# Patient Record
Sex: FEMALE | Race: WHITE | NOT HISPANIC OR LATINO | Employment: OTHER | URBAN - METROPOLITAN AREA
[De-identification: names, ages, dates, MRNs, and addresses within clinical notes are randomized per-mention and may not be internally consistent; named-entity substitution may affect disease eponyms.]

---

## 2021-05-24 ENCOUNTER — RECORDS - HEALTHEAST (OUTPATIENT)
Dept: ADMINISTRATIVE | Facility: CLINIC | Age: 71
End: 2021-05-24

## 2023-05-20 ENCOUNTER — APPOINTMENT (OUTPATIENT)
Dept: ULTRASOUND IMAGING | Facility: CLINIC | Age: 73
End: 2023-05-20
Attending: EMERGENCY MEDICINE
Payer: COMMERCIAL

## 2023-05-20 ENCOUNTER — APPOINTMENT (OUTPATIENT)
Dept: RADIOLOGY | Facility: CLINIC | Age: 73
End: 2023-05-20
Attending: EMERGENCY MEDICINE
Payer: COMMERCIAL

## 2023-05-20 ENCOUNTER — APPOINTMENT (OUTPATIENT)
Dept: CT IMAGING | Facility: CLINIC | Age: 73
End: 2023-05-20
Attending: EMERGENCY MEDICINE
Payer: COMMERCIAL

## 2023-05-20 ENCOUNTER — HOSPITAL ENCOUNTER (EMERGENCY)
Facility: CLINIC | Age: 73
Discharge: HOME | End: 2023-05-20
Attending: EMERGENCY MEDICINE | Admitting: EMERGENCY MEDICINE
Payer: COMMERCIAL

## 2023-05-20 VITALS
HEIGHT: 65 IN | TEMPERATURE: 98 F | RESPIRATION RATE: 16 BRPM | WEIGHT: 260 LBS | SYSTOLIC BLOOD PRESSURE: 187 MMHG | OXYGEN SATURATION: 98 % | BODY MASS INDEX: 43.32 KG/M2 | DIASTOLIC BLOOD PRESSURE: 71 MMHG | HEART RATE: 62 BPM

## 2023-05-20 DIAGNOSIS — M75.01 ADHESIVE CAPSULITIS OF RIGHT SHOULDER: ICD-10-CM

## 2023-05-20 LAB
ALBUMIN SERPL-MCNC: 3.9 G/DL (ref 3.5–5)
ALP SERPL-CCNC: 87 U/L (ref 45–120)
ALT SERPL W P-5'-P-CCNC: 16 U/L (ref 0–45)
ANION GAP SERPL CALCULATED.3IONS-SCNC: 10 MMOL/L (ref 5–18)
AST SERPL W P-5'-P-CCNC: 16 U/L (ref 0–40)
ATRIAL RATE - MUSE: 69 BPM
BASOPHILS # BLD AUTO: 0 10E3/UL (ref 0–0.2)
BASOPHILS NFR BLD AUTO: 0 %
BILIRUB SERPL-MCNC: 0.5 MG/DL (ref 0–1)
BNP SERPL-MCNC: 47 PG/ML (ref 0–120)
BUN SERPL-MCNC: 13 MG/DL (ref 8–28)
CALCIUM SERPL-MCNC: 9.5 MG/DL (ref 8.5–10.5)
CHLORIDE BLD-SCNC: 107 MMOL/L (ref 98–107)
CO2 SERPL-SCNC: 25 MMOL/L (ref 22–31)
CREAT SERPL-MCNC: 0.82 MG/DL (ref 0.6–1.1)
DIASTOLIC BLOOD PRESSURE - MUSE: NORMAL MMHG
EOSINOPHIL # BLD AUTO: 0.1 10E3/UL (ref 0–0.7)
EOSINOPHIL NFR BLD AUTO: 1 %
ERYTHROCYTE [DISTWIDTH] IN BLOOD BY AUTOMATED COUNT: 12.9 % (ref 10–15)
GFR SERPL CREATININE-BSD FRML MDRD: 77 ML/MIN/1.73M2
GLUCOSE BLD-MCNC: 173 MG/DL (ref 70–125)
HCT VFR BLD AUTO: 42.3 % (ref 35–47)
HGB BLD-MCNC: 13.8 G/DL (ref 11.7–15.7)
HOLD SPECIMEN: NORMAL
IMM GRANULOCYTES # BLD: 0.1 10E3/UL
IMM GRANULOCYTES NFR BLD: 1 %
INTERPRETATION ECG - MUSE: NORMAL
LYMPHOCYTES # BLD AUTO: 1.7 10E3/UL (ref 0.8–5.3)
LYMPHOCYTES NFR BLD AUTO: 18 %
MCH RBC QN AUTO: 30.1 PG (ref 26.5–33)
MCHC RBC AUTO-ENTMCNC: 32.6 G/DL (ref 31.5–36.5)
MCV RBC AUTO: 92 FL (ref 78–100)
MONOCYTES # BLD AUTO: 0.5 10E3/UL (ref 0–1.3)
MONOCYTES NFR BLD AUTO: 6 %
NEUTROPHILS # BLD AUTO: 7.2 10E3/UL (ref 1.6–8.3)
NEUTROPHILS NFR BLD AUTO: 74 %
NRBC # BLD AUTO: 0 10E3/UL
NRBC BLD AUTO-RTO: 0 /100
P AXIS - MUSE: 58 DEGREES
PLATELET # BLD AUTO: 320 10E3/UL (ref 150–450)
POTASSIUM BLD-SCNC: 4.1 MMOL/L (ref 3.5–5)
PR INTERVAL - MUSE: 156 MS
PROT SERPL-MCNC: 7.2 G/DL (ref 6–8)
QRS DURATION - MUSE: 84 MS
QT - MUSE: 388 MS
QTC - MUSE: 415 MS
R AXIS - MUSE: 19 DEGREES
RBC # BLD AUTO: 4.58 10E6/UL (ref 3.8–5.2)
SODIUM SERPL-SCNC: 142 MMOL/L (ref 136–145)
SYSTOLIC BLOOD PRESSURE - MUSE: NORMAL MMHG
T AXIS - MUSE: 27 DEGREES
TROPONIN I SERPL-MCNC: <0.01 NG/ML (ref 0–0.29)
VENTRICULAR RATE- MUSE: 69 BPM
WBC # BLD AUTO: 9.6 10E3/UL (ref 4–11)

## 2023-05-20 PROCEDURE — 93005 ELECTROCARDIOGRAM TRACING: CPT | Performed by: EMERGENCY MEDICINE

## 2023-05-20 PROCEDURE — 250N000011 HC RX IP 250 OP 636: Performed by: EMERGENCY MEDICINE

## 2023-05-20 PROCEDURE — 80053 COMPREHEN METABOLIC PANEL: CPT | Performed by: EMERGENCY MEDICINE

## 2023-05-20 PROCEDURE — 99285 EMERGENCY DEPT VISIT HI MDM: CPT | Mod: 25

## 2023-05-20 PROCEDURE — 93971 EXTREMITY STUDY: CPT | Mod: RT

## 2023-05-20 PROCEDURE — 73206 CT ANGIO UPR EXTRM W/O&W/DYE: CPT | Mod: RT

## 2023-05-20 PROCEDURE — 85025 COMPLETE CBC W/AUTO DIFF WBC: CPT | Performed by: EMERGENCY MEDICINE

## 2023-05-20 PROCEDURE — 83880 ASSAY OF NATRIURETIC PEPTIDE: CPT | Performed by: EMERGENCY MEDICINE

## 2023-05-20 PROCEDURE — 84484 ASSAY OF TROPONIN QUANT: CPT | Performed by: EMERGENCY MEDICINE

## 2023-05-20 PROCEDURE — 71046 X-RAY EXAM CHEST 2 VIEWS: CPT

## 2023-05-20 PROCEDURE — 36415 COLL VENOUS BLD VENIPUNCTURE: CPT | Performed by: EMERGENCY MEDICINE

## 2023-05-20 PROCEDURE — 250N000013 HC RX MED GY IP 250 OP 250 PS 637: Performed by: EMERGENCY MEDICINE

## 2023-05-20 RX ORDER — HYDROCODONE BITARTRATE AND ACETAMINOPHEN 5; 325 MG/1; MG/1
1 TABLET ORAL EVERY 6 HOURS PRN
Qty: 10 TABLET | Refills: 0 | Status: SHIPPED | OUTPATIENT
Start: 2023-05-20 | End: 2023-05-20

## 2023-05-20 RX ORDER — TRAMADOL HYDROCHLORIDE 50 MG/1
50 TABLET ORAL EVERY 6 HOURS PRN
Qty: 10 TABLET | Refills: 0 | Status: SHIPPED | OUTPATIENT
Start: 2023-05-20 | End: 2023-05-21

## 2023-05-20 RX ORDER — CYCLOBENZAPRINE HCL 10 MG
10 TABLET ORAL ONCE
Status: COMPLETED | OUTPATIENT
Start: 2023-05-20 | End: 2023-05-20

## 2023-05-20 RX ORDER — IOPAMIDOL 755 MG/ML
100 INJECTION, SOLUTION INTRAVASCULAR ONCE
Status: COMPLETED | OUTPATIENT
Start: 2023-05-20 | End: 2023-05-20

## 2023-05-20 RX ADMIN — CYCLOBENZAPRINE 10 MG: 10 TABLET, FILM COATED ORAL at 16:53

## 2023-05-20 RX ADMIN — IOPAMIDOL 100 ML: 755 INJECTION, SOLUTION INTRAVENOUS at 18:46

## 2023-05-20 ASSESSMENT — ACTIVITIES OF DAILY LIVING (ADL)
ADLS_ACUITY_SCORE: 35

## 2023-05-20 NOTE — ED TRIAGE NOTES
Patient states that right arm pain started Wednesday. Radiating into right shoulder. Took several different pain medications without relief. Center chest pain started around 11AM.   Increased swelling in bilateral feet. 10lbs weight gain in last 3 months.   Hx of diabetes. Pt states her blood sugars have been elevated since being on vacation 3 weeks ago.      Triage Assessment     Row Name 05/20/23 3721       Triage Assessment (Adult)    Airway WDL WDL       Respiratory WDL    Respiratory WDL WDL       Skin Circulation/Temperature WDL    Skin Circulation/Temperature WDL WDL       Cardiac WDL    Cardiac WDL chest pain       Chest Pain Assessment    Chest Pain Location midsternal    Chest Pain Radiation arm    Character --  tender       Peripheral/Neurovascular WDL    Peripheral Neurovascular WDL WDL       Cognitive/Neuro/Behavioral WDL    Cognitive/Neuro/Behavioral WDL WDL

## 2023-05-20 NOTE — ED PROVIDER NOTES
EMERGENCY DEPARTMENT ENCOUNTER            IMPRESSION:  Right shoulder pain -adhesive capsulitis        MEDICAL DECISION MAKING:  It was my pleasure to provide care for Maryan Mendoza who presented for evaluation of right shoulder pain.  She has weakness with extension.  She also reported some pain in her neck and chest.  She also reported some subjective swelling    On my exam patient is pleasant and cooperative.  No evidence of distress.  Vital signs show hypertension.  Physical exam notable for normal strength and sensation.  She has pain with lateral extension of the arm..     Muscle relaxant administered for symptom relief.  Patient's symptoms improved.     EKG shows no ischemia    Significant laboratory findings include are reassured    Right upper extremity ultrasound and CTA of the right upper extremity imaging showed no significant findings.  Imaging independently reviewed and agree with radiologist findings.     Initially the differential included possible upper extremity DVT or vascular abnormality.  Imaging was negative.  She does not have any distal weakness or sensory symptoms so I doubt this is cervical radiculopathy.  I will treat her for right shoulder adhesive capsulitis.  She was given orthopedic referral    Patient was reevaluated and results were discussed.  I recommended a sling, pain medication, and outpatient orthopedic referral      Prior to making a final disposition on this patient the results of patient's tests and other diagnostic studies were discussed with the patient. All questions were answered. Patient expressed understanding of the plan and was amenable.   Return precautions and follow-up were discussed.     =================================================================  CHIEF COMPLAINT:  Chief Complaint   Patient presents with     Arm Pain     Chest Pain         HPI  Maryan Mendoza is a 72 year old female with a history of type 2 diabetes mellitus, hypertension, and  hyperlipidemia who presents to the ED by private vehicle, unaccompanied, for evaluation of arm pain.     The patient reports the onset of right upper arm pain three days ago (5/17). She states that this pain has persisted and now is located in her right shoulder and radiates into her neck and the right side of her chest. Due to this pain radiating into her chest she was concerned that she was having a heart attack, making her very anxious, and prompting presentation to the ED. She describes her pain as a soreness, which is provoked by movement, specifically raising her arms. She notes that she does have a history of carpal tunnel, but that her current pain does not feel like the pain she experienced with that. She states that she has never had this pain before. She endorses mild left shoulder and left side of neck swelling, and slightly higher blood sugars for the last three weeks.  The patient states that she has gained ten pounds in the last three months. The patient denies increased leg swelling, vomiting, shortness of breath, and any other symptoms or complaints at this time.     Of note, the patient got back on 5/17 from a road trip in which she drove from [place] to [place] to [place] back to [place]. She states that this trip took about three weeks but she did a lot of sitting during this time.     REVIEW OF SYSTEMS   Constitutional: Does not report chills, unintentional weight loss or fatigue   Eyes: Does not report visual changes or discharge    HENT: Does not report sore throat, ear pain or neck pain  Respiratory: Does not report cough or shortness of breath    Cardiovascular: Does not report palpitations or leg swelling. Reports right sided chest pain   GI: Does not report abdominal pain, nausea, vomiting, or dark, bloody stools.    : Does not report hematuria, dysuria, or flank pain  Musculoskeletal: Reports right upper arm, shoulder, and neck pain, and right shoulder and neck swelling  Skin: Does not report  "rash or wound  Neurologic: Does not report current headache, new weakness, focal weakness, or sensory changes    Pysch: Reports anxiety      Remainder of systems reviewed, unless noted in HPI all others negative.      PAST MEDICAL HISTORY:  History reviewed. No pertinent past medical history.    PAST SURGICAL HISTORY:  Past Surgical History:   Procedure Laterality Date     CHOLECYSTECTOMY           CURRENT MEDICATIONS:    HYDROcodone-acetaminophen (NORCO) 5-325 MG tablet  aspirin 81 MG EC tablet  lisinopril (PRINIVIL,ZESTRIL) 10 MG tablet  simvastatin (ZOCOR) 10 MG tablet        ALLERGIES:  Allergies   Allergen Reactions     Dilaudid [Hydromorphone] Nausea and Vomiting     Morphine Nausea and Vomiting       FAMILY HISTORY:  Family History   Problem Relation Age of Onset     Urolithiasis Mother         single     Diabetes Mother      Diabetes Father      Urolithiasis Brother         recurrent     Cancer Paternal Grandmother      Urolithiasis Brother         recuurent     Urolithiasis Daughter         single       SOCIAL HISTORY:   Social History     Socioeconomic History     Marital status:    Tobacco Use     Smoking status: Never   Substance and Sexual Activity     Alcohol use: Yes     Comment: Alcoholic Drinks/day: extremely rare     Drug use: No   Social History Narrative    The patient lives at home with her family.        PHYSICAL EXAM:    BP (!) 167/74   Pulse 62   Temp 98  F (36.7  C) (Oral)   Resp 16   Ht 1.651 m (5' 5\")   Wt 117.9 kg (260 lb)   SpO2 98%   BMI 43.27 kg/m      Constitutional: Awake, alert, no evidence of distress  Head: Normocephalic, atraumatic.  ENT: Mucous membranes are moist.  No pallor.   Eyes: Pupils are reactive.  No discoloration.  Neck: No lymphadenopathy, no stridor, supple, no soft tissue swelling  Chest: No tenderness   Respiratory: Respirations even, unlabored. Lungs clear to ascultation bilaterally, in no acute respiratory distress.  Cardiovascular: Regular rate and " rhythm.  Good overall perfusion.  Upper and lower extremity pulses are equal.  GI: Abdomen soft, non-tender to palpation.  No guarding or rebound. Bowel sounds present throughout.   Back: No CVA tenderness.    Musculoskeletal: Right upper extremity is normal to inspection.  There is no erythema.  She has pain with abduction.  Pain with abduction internal rotation.  Pulses are good distally.  Sensory is intact and she has good  strength  Integument: Warm, dry. No rash. No bruising or petechiae.  Neurologic: Alert & oriented x 3. Normal speech. Grossly normal motor and sensory function. No focal deficits noted.  NIHSS = 0  Psychiatric: Normal mood and affect.  Appropriate judgement.    ED COURSE:  2:10 PM I met with the patient for the initial interview and physical examination. Discussed plan for treatment and workup in the ED. The patient declined pain medication at this time.  4:41 PM Patient's RN reports that the patient is now requesting a muscle relaxer. I have ordered flexeril.   5:09 PM I rechecked and updated the patient.   8:25 PM I rechecked and updated the patient. I discussed the plan for discharge with the patient, and patient is agreeable. We discussed supportive cares at home and reasons for return to the ER including new or worsening symptoms - all questions and concerns addressed. Patient to be discharged by RN.     Medical Decision Making    History:    Supplemental history from: NA    External Record(s) reviewed: External medical records including care everywhere reviewed    Work Up:    EKG, laboratory and imaging studies as ordered were independently reviewed by the provider    Broad differential diagnosis considered for right upper extremity chest and back    The patient's presentation was of high complexity.       Complicating factors:    Patient has a complicated past medical history including type 2 diabetes mellitus, hypertension, hyperlipidemia     Care affected by social determinants of  health: Access to primary care    Disposition involved shared decision-making with the patient.    Prescription medication ordered.  Patient otherwise to continue outpatient medications as prescribed.       LAB:  Laboratory results were independently reviewed and interpreted  Results for orders placed or performed during the hospital encounter of 05/20/23   XR Chest 2 Views    Impression    IMPRESSION: Negative chest.     US Upper Extremity Venous Duplex Right    Impression    IMPRESSION:  1.  No deep venous thrombosis in the right upper extremity.   CTA Upper Extremity Right Runoff w Contrast    Impression    IMPRESSION:  1.  No evidence of significant arterial stenosis or occlusion in the right upper extremity.       Extra Blue Top Tube   Result Value Ref Range    Hold Specimen JIC    Extra Green Top (Lithium Heparin) Tube   Result Value Ref Range    Hold Specimen JIC    Extra Purple Top Tube   Result Value Ref Range    Hold Specimen JIC    Extra Urine Collection   Result Value Ref Range    Hold Specimen JIC    Comprehensive metabolic panel   Result Value Ref Range    Sodium 142 136 - 145 mmol/L    Potassium 4.1 3.5 - 5.0 mmol/L    Chloride 107 98 - 107 mmol/L    Carbon Dioxide (CO2) 25 22 - 31 mmol/L    Anion Gap 10 5 - 18 mmol/L    Urea Nitrogen 13 8 - 28 mg/dL    Creatinine 0.82 0.60 - 1.10 mg/dL    Calcium 9.5 8.5 - 10.5 mg/dL    Glucose 173 (H) 70 - 125 mg/dL    Alkaline Phosphatase 87 45 - 120 U/L    AST 16 0 - 40 U/L    ALT 16 0 - 45 U/L    Protein Total 7.2 6.0 - 8.0 g/dL    Albumin 3.9 3.5 - 5.0 g/dL    Bilirubin Total 0.5 0.0 - 1.0 mg/dL    GFR Estimate 77 >60 mL/min/1.73m2   Result Value Ref Range    Troponin I <0.01 0.00 - 0.29 ng/mL   B-Type Natriuretic Peptide ([facility] Only)   Result Value Ref Range    BNP 47 0 - 120 pg/mL   CBC with platelets and differential   Result Value Ref Range    WBC Count 9.6 4.0 - 11.0 10e3/uL    RBC Count 4.58 3.80 - 5.20 10e6/uL    Hemoglobin 13.8 11.7 - 15.7 g/dL     Hematocrit 42.3 35.0 - 47.0 %    MCV 92 78 - 100 fL    MCH 30.1 26.5 - 33.0 pg    MCHC 32.6 31.5 - 36.5 g/dL    RDW 12.9 10.0 - 15.0 %    Platelet Count 320 150 - 450 10e3/uL    % Neutrophils 74 %    % Lymphocytes 18 %    % Monocytes 6 %    % Eosinophils 1 %    % Basophils 0 %    % Immature Granulocytes 1 %    NRBCs per 100 WBC 0 <1 /100    Absolute Neutrophils 7.2 1.6 - 8.3 10e3/uL    Absolute Lymphocytes 1.7 0.8 - 5.3 10e3/uL    Absolute Monocytes 0.5 0.0 - 1.3 10e3/uL    Absolute Eosinophils 0.1 0.0 - 0.7 10e3/uL    Absolute Basophils 0.0 0.0 - 0.2 10e3/uL    Absolute Immature Granulocytes 0.1 <=0.4 10e3/uL    Absolute NRBCs 0.0 10e3/uL   ECG 12-LEAD WITH MUSE ([facility])   Result Value Ref Range    Systolic Blood Pressure  mmHg    Diastolic Blood Pressure  mmHg    Ventricular Rate 69 BPM    Atrial Rate 69 BPM    WA Interval 156 ms    QRS Duration 84 ms     ms    QTc 415 ms    P Axis 58 degrees    R AXIS 19 degrees    T Axis 27 degrees    Interpretation ECG       Sinus rhythm  Low voltage QRS  Borderline ECG  No previous ECGs available  Confirmed by SEE ED PROVIDER NOTE FOR, ECG INTERPRETATION ([id number]),  JAUN BUSTILLOS ([id number]) on 5/20/2023 4:42:15 PM           RADIOLOGY:  Radiology reports were independently reviewed and interpreted  CTA Upper Extremity Right Runoff w Contrast   Final Result   IMPRESSION:   1.  No evidence of significant arterial stenosis or occlusion in the right upper extremity.            US Upper Extremity Venous Duplex Right   Final Result   IMPRESSION:   1.  No deep venous thrombosis in the right upper extremity.      XR Chest 2 Views   Final Result   IMPRESSION: Negative chest.              EKG:    Performed at: 13:48  Impression: Sinus rhythm. Low voltage QRS. Borderline ECG.     Rate: 69 BPM  WA Interval: 156 ms  QRS Duration: 84 ms  QTc Interval: 415 ms    Comparison: No previous EKGs available for comparison.    I have independently reviewed and interpreted the EKG(s) documented  above.        MEDICATIONS GIVEN IN THE EMERGENCY:  Medications   cyclobenzaprine (FLEXERIL) tablet 10 mg (10 mg Oral $Given 5/20/23 1654)   iopamidol (ISOVUE-370) solution 100 mL (100 mLs Intravenous $Given 5/20/23 1846)           NEW PRESCRIPTIONS STARTED AT TODAY'S ER VISIT:  New Prescriptions    HYDROCODONE-ACETAMINOPHEN (NORCO) 5-325 MG TABLET    Take 1 tablet by mouth every 6 hours as needed for severe pain                FINAL DIAGNOSIS:    ICD-10-CM    1. Adhesive capsulitis of right shoulder  M75.01 Neck/Shoulder/Back Order Sling; Right                 NAME: Maryan Mendoza  AGE: 72 year old female  YOB: 1950  MRN: [id number]  EVALUATION DATE & TIME: 5/20/2023  1:51 PM    PCP: No primary care provider on file.    ED PROVIDER: Ed Patel M.D.      IDebi, am serving as a scribe to document services personally performed by Dr. Ed Patel based on my observation and the provider's statements to me. I, Ed Patel MD attest that Debi Kelly is acting in a scribe capacity, has observed my performance of the services and has documented them in accordance with my direction.    Ed Patel M.D.  Emergency Medicine  [facility] EMERGENCY ROOM  [address] [address]  [phone]  Dept: [phone]  5/20/2023        Ed Patel MD  05/20/23 2038

## 2023-05-21 RX ORDER — TRAMADOL HYDROCHLORIDE 50 MG/1
50 TABLET ORAL EVERY 6 HOURS PRN
Qty: 10 TABLET | Refills: 0 | Status: SHIPPED | OUTPATIENT
Start: 2023-05-21

## 2023-05-21 NOTE — ED NOTES
Patient received from Kaylen BOOTH. Stable at this time, no signs of distress, per handoff. Resting in bed at this time. Just returned from CT. To see by this RN.

## 2023-05-21 NOTE — ED NOTES
Patient discharged to home as ordered. Stable at time of discharge. No signs of distress. Shoulder immobilizer placed and patient given instructions on home use. Verbalized full understanding of discharge teaching. IV removed; catheter tip intact. Ambulated out of unit with all belongings, paperwork, and prescription. Instructed on referral.

## 2023-05-21 NOTE — ED NOTES
Patient seen. Stable at this time. No signs of distress. Assessment as documented. Up to bathroom, and back on monitoring. To await results of CT.

## 2023-05-21 NOTE — ED NOTES
Pt calls reporting the prescription for tramadol did not go through.  Requesting it to be sent to [facility] in [place]

## 2023-05-21 NOTE — DISCHARGE INSTRUCTIONS
The ultrasound and scan of your shoulder did not show any significant abnormality  You have been prescribed Vicodin for pain  Shoulder immobilizer for your comfort  Follow-up with referral provider.  Call tomorrow for a follow-up appointment next week

## 2023-05-21 NOTE — ED PROVIDER NOTES
"In reviewing patient's prescriptions, she reports that she is allergic to oxycodone, \"and codone family.\"  Chart also notes allergy to Dilaudid and morphine.  Prescription for tramadol sent to pharmacy.     Ed Sanchez MD  05/20/23 7580    "